# Patient Record
Sex: MALE | Race: WHITE
[De-identification: names, ages, dates, MRNs, and addresses within clinical notes are randomized per-mention and may not be internally consistent; named-entity substitution may affect disease eponyms.]

---

## 2018-09-11 ENCOUNTER — HOSPITAL ENCOUNTER (OUTPATIENT)
Dept: HOSPITAL 63 - DXRAD | Age: 78
Discharge: HOME | End: 2018-09-11
Attending: PHYSICIAN ASSISTANT
Payer: COMMERCIAL

## 2018-09-11 DIAGNOSIS — K21.9: ICD-10-CM

## 2018-09-11 DIAGNOSIS — M17.11: Primary | ICD-10-CM

## 2018-09-11 DIAGNOSIS — J44.9: ICD-10-CM

## 2018-09-11 PROCEDURE — 71046 X-RAY EXAM CHEST 2 VIEWS: CPT

## 2018-09-11 PROCEDURE — 73562 X-RAY EXAM OF KNEE 3: CPT

## 2018-09-11 NOTE — RAD
Right knee, 3 views, 9/11/2018:

 

HISTORY: Osteoarthritis

 

There is mild narrowing of the lateral compartment of the knee joint with 

mild marginal spurring. Mild spurring is present the patellofemoral 

articulation. No fracture or dislocation is identified. No significant 

joint effusion is seen. Moderate scattered arterial calcifications are 

present.

 

IMPRESSION:

1. Mild degenerative change.

2. No acute bony abnormality is detected.

 

 

 

Chest, 2 views, 9/11/2018:

 

HISTORY: Cough, COPD

 

The heart size and pulmonary vascularity are normal. There is mild 

tortuosity of the thoracic aorta. No pulmonary infiltrate is seen. There 

is no evidence of pleural fluid. Nodular shadows projected over the lung 

bases are probably nipple shadows. Mild spurring is present in the spine.

 

IMPRESSION: No acute cardiopulmonary abnormality is detected.

 

Electronically signed by: Rick Moritz, MD (9/11/2018 3:56 PM) [facility]

## 2018-09-11 NOTE — RAD
Right knee, 3 views, 9/11/2018:

 

HISTORY: Osteoarthritis

 

There is mild narrowing of the lateral compartment of the knee joint with 

mild marginal spurring. Mild spurring is present the patellofemoral 

articulation. No fracture or dislocation is identified. No significant 

joint effusion is seen. Moderate scattered arterial calcifications are 

present.

 

IMPRESSION:

1. Mild degenerative change.

2. No acute bony abnormality is detected.

 

 

 

Chest, 2 views, 9/11/2018:

 

HISTORY: Cough, COPD

 

The heart size and pulmonary vascularity are normal. There is mild 

tortuosity of the thoracic aorta. No pulmonary infiltrate is seen. There 

is no evidence of pleural fluid. Nodular shadows projected over the lung 

bases are probably nipple shadows. Mild spurring is present in the spine.

 

IMPRESSION: No acute cardiopulmonary abnormality is detected.

 

Electronically signed by: Rick Moritz, MD (9/11/2018 3:56 PM) [facility]

## 2019-06-17 ENCOUNTER — HOSPITAL ENCOUNTER (OUTPATIENT)
Dept: HOSPITAL 63 - DXRAD | Age: 79
Discharge: HOME | End: 2019-06-17
Attending: FAMILY MEDICINE
Payer: COMMERCIAL

## 2019-06-17 DIAGNOSIS — R63.4: ICD-10-CM

## 2019-06-17 DIAGNOSIS — I77.89: Primary | ICD-10-CM

## 2019-06-17 PROCEDURE — 71046 X-RAY EXAM CHEST 2 VIEWS: CPT

## 2019-06-17 NOTE — RAD
Chest, 2 views, 6/17/2019:

 

HISTORY: Weight loss

 

Comparison is made to a study from 9/11/2018. The heart size is within 

normal limits. There is mild tortuosity of the thoracic aorta. There is 

minimal linear scarring or atelectasis in the posterior costophrenic angle

on the right. No pulmonary consolidation is seen. There is no evidence of 

pleural fluid. Mild scattered spurs are present in the spine.

 

IMPRESSION: Minimal right basilar linear scarring or atelectasis.

 

Electronically signed by: Rick Moritz, MD (6/17/2019 2:23 PM) [facility]

## 2019-06-20 ENCOUNTER — HOSPITAL ENCOUNTER (OUTPATIENT)
Dept: HOSPITAL 63 - US | Age: 79
Discharge: HOME | End: 2019-06-20
Payer: COMMERCIAL

## 2019-06-20 DIAGNOSIS — R63.4: ICD-10-CM

## 2019-06-20 DIAGNOSIS — R10.9: ICD-10-CM

## 2019-06-20 DIAGNOSIS — R11.0: Primary | ICD-10-CM

## 2019-06-20 LAB
ALBUMIN SERPL-MCNC: 3.9 G/DL (ref 3.4–5)
ALBUMIN/GLOB SERPL: 1.1 {RATIO} (ref 1–1.7)
ALP SERPL-CCNC: 66 U/L (ref 46–116)
ALT SERPL-CCNC: 83 U/L (ref 16–63)
AMYLASE SERPL-CCNC: 26 U/L (ref 25–115)
ANION GAP SERPL CALC-SCNC: 9 MMOL/L (ref 6–14)
APTT PPP: (no result) S
AST SERPL-CCNC: 63 U/L (ref 15–37)
BACTERIA #/AREA URNS HPF: 0 /HPF
BILIRUB SERPL-MCNC: 1.3 MG/DL (ref 0.2–1)
BILIRUB UR QL STRIP: (no result)
BUN/CREAT SERPL: 20 (ref 6–20)
CA-I SERPL ISE-MCNC: 16 MG/DL (ref 8–26)
CALCIUM SERPL-MCNC: 9 MG/DL (ref 8.5–10.1)
CHLORIDE SERPL-SCNC: 105 MMOL/L (ref 98–107)
CO2 SERPL-SCNC: 27 MMOL/L (ref 21–32)
CREAT SERPL-MCNC: 0.8 MG/DL (ref 0.7–1.3)
FIBRINOGEN PPP-MCNC: (no result) MG/DL
GFR SERPLBLD BASED ON 1.73 SQ M-ARVRAT: 93.7 ML/MIN
GLOBULIN SER-MCNC: 3.7 G/DL (ref 2.2–3.8)
GLUCOSE SERPL-MCNC: 117 MG/DL (ref 70–99)
GLUCOSE UR STRIP-MCNC: (no result) MG/DL
LIPASE: 120 U/L (ref 73–393)
NITRITE UR QL STRIP: (no result)
POTASSIUM SERPL-SCNC: 3.7 MMOL/L (ref 3.5–5.1)
PROT SERPL-MCNC: 7.6 G/DL (ref 6.4–8.2)
RBC #/AREA URNS HPF: 0 /HPF (ref 0–2)
SODIUM SERPL-SCNC: 141 MMOL/L (ref 136–145)
SP GR UR STRIP: 1.02
SQUAMOUS #/AREA URNS LPF: (no result) /LPF
UROBILINOGEN UR-MCNC: 0.2 MG/DL
WBC #/AREA URNS HPF: 0 /HPF (ref 0–4)

## 2019-06-20 PROCEDURE — 83690 ASSAY OF LIPASE: CPT

## 2019-06-20 PROCEDURE — 80053 COMPREHEN METABOLIC PANEL: CPT

## 2019-06-20 PROCEDURE — 81001 URINALYSIS AUTO W/SCOPE: CPT

## 2019-06-20 PROCEDURE — 82140 ASSAY OF AMMONIA: CPT

## 2019-06-20 PROCEDURE — 87086 URINE CULTURE/COLONY COUNT: CPT

## 2019-06-20 PROCEDURE — 82150 ASSAY OF AMYLASE: CPT

## 2019-06-20 PROCEDURE — 36415 COLL VENOUS BLD VENIPUNCTURE: CPT

## 2019-06-20 PROCEDURE — 76700 US EXAM ABDOM COMPLETE: CPT

## 2019-06-20 NOTE — RAD
Complete abdominal ultrasound 6/20/2019 10:00 AM

 

Clinical History: Nausea, abdominal pain, weight loss

 

Technique:  Ultrasound examination of the abdomen was performed, and 

multiple static images were submitted for review.

 

Comparison:  CT of the abdomen and pelvis June 11, 2013  

 

Findings: Pancreas is poorly visualized. Portions of the aorta and IVC are

grossly unremarkable. The gallbladder is partially visualized. Visualized 

portions of the gallbladder demonstrates no wall thickening, or stones. 

The common bile duct is within normal limits measuring 4 mm diameter. The 

liver appears to be normal in size measuring approximately 14.5 cm 

longitudinally. No focal hepatic lesions are seen. No biliary dilatation 

is identified. The right kidney is unremarkable in appearance measuring 

12.6 cm in length. Left kidney is normal in appearance. The spleen is top 

normal measuring 11.8 cm longitudinal.

 

IMPRESSION:

1. No acute intra-abdominal abnormality is identified

2. Top normal size of the spleen

 

Electronically signed by: Melchor Terry MD (6/20/2019 12:55 PM) [id number]

## 2019-06-27 ENCOUNTER — HOSPITAL ENCOUNTER (OUTPATIENT)
Dept: HOSPITAL 63 - CT | Age: 79
Discharge: HOME | End: 2019-06-27
Payer: COMMERCIAL

## 2019-06-27 DIAGNOSIS — M41.85: ICD-10-CM

## 2019-06-27 DIAGNOSIS — I70.0: ICD-10-CM

## 2019-06-27 DIAGNOSIS — N40.0: ICD-10-CM

## 2019-06-27 DIAGNOSIS — K57.30: Primary | ICD-10-CM

## 2019-06-27 DIAGNOSIS — I25.10: ICD-10-CM

## 2019-06-27 PROCEDURE — 87340 HEPATITIS B SURFACE AG IA: CPT

## 2019-06-27 PROCEDURE — 74177 CT ABD & PELVIS W/CONTRAST: CPT

## 2019-06-27 PROCEDURE — 36415 COLL VENOUS BLD VENIPUNCTURE: CPT

## 2019-06-27 PROCEDURE — 86705 HEP B CORE ANTIBODY IGM: CPT

## 2019-06-27 PROCEDURE — 83520 IMMUNOASSAY QUANT NOS NONAB: CPT

## 2019-06-27 PROCEDURE — 86709 HEPATITIS A IGM ANTIBODY: CPT

## 2019-06-27 PROCEDURE — 86803 HEPATITIS C AB TEST: CPT

## 2019-06-27 PROCEDURE — 86038 ANTINUCLEAR ANTIBODIES: CPT

## 2019-06-27 NOTE — RAD
CT of the abdomen and pelvis with contrast, 6/27/2019:

 

HISTORY: Abdominal pain, weight loss, nausea and vomiting

 

Multidetector CT imaging was performed following oral and IV 

administration of contrast.

 

There is mild bibasilar linear scarring and/or atelectasis. Coronary 

artery calcifications are noted. There appear to be calcifications in the 

region of the aortic valve as well as the mitral annulus.

 

No hepatic abnormality is seen. The gallbladder is unremarkable. No 

pancreatic abnormality is seen. The spleen is at the upper limits of 

normal in size measuring 12.7 cm in length. It contains several 

calcifications. No renal abnormality is detected.

 

There is moderate calcific plaquing of the abdominal aorta and its 

branches without evidence of aneurysm. No abdominal or pelvic adenopathy 

is seen. The prostate gland is enlarged measuring 6 cm in length. It 

contains calcifications.

 

Colonic diverticula are present, most extensive in the sigmoid region. No 

paracolonic inflammatory process is seen. The bowel loops are not dilated.

The appendix shows no abnormality. No free fluid or free air is evident in

the abdomen or pelvis.

 

There is a mild thoracolumbar scoliosis with moderate multilevel 

degenerative change.

 

IMPRESSION:

1. Extensive sigmoid diverticulosis.

2. Nonspecific prostatic enlargement.

3. Calcific plaquing of the coronary arteries and the aortic valve.

 

 

 

PQRS Compliance Statement:

 

One or more of the following individualized dose reduction techniques were

utilized for this examination:

1. Automated exposure control

2. Adjustment of the mA and/or kV according to patient size

3. Use of iterative reconstruction technique

 

Electronically signed by: Rick Moritz, MD (6/27/2019 4:12 PM) [facility]

## 2019-06-30 LAB — MITOCHONDRIA AB SER QL: <20 UNITS (ref 0–20)

## 2019-07-05 ENCOUNTER — HOSPITAL ENCOUNTER (OUTPATIENT)
Dept: HOSPITAL 61 - SURG | Age: 79
End: 2019-07-05
Attending: INTERNAL MEDICINE
Payer: COMMERCIAL

## 2019-07-05 VITALS
SYSTOLIC BLOOD PRESSURE: 131 MMHG | DIASTOLIC BLOOD PRESSURE: 68 MMHG | DIASTOLIC BLOOD PRESSURE: 68 MMHG | SYSTOLIC BLOOD PRESSURE: 131 MMHG

## 2019-07-05 DIAGNOSIS — Z98.890: ICD-10-CM

## 2019-07-05 DIAGNOSIS — K57.30: ICD-10-CM

## 2019-07-05 DIAGNOSIS — F15.90: ICD-10-CM

## 2019-07-05 DIAGNOSIS — Z72.89: ICD-10-CM

## 2019-07-05 DIAGNOSIS — K29.50: Primary | ICD-10-CM

## 2019-07-05 DIAGNOSIS — Z98.52: ICD-10-CM

## 2019-07-05 DIAGNOSIS — Z87.39: ICD-10-CM

## 2019-07-05 PROCEDURE — 43235 EGD DIAGNOSTIC BRUSH WASH: CPT

## 2021-02-03 ENCOUNTER — HOSPITAL ENCOUNTER (OUTPATIENT)
Dept: HOSPITAL 61 - KCIC MRI | Age: 81
End: 2021-02-03
Payer: COMMERCIAL

## 2021-02-03 DIAGNOSIS — I67.82: Primary | ICD-10-CM

## 2021-02-03 DIAGNOSIS — G30.1: ICD-10-CM

## 2021-02-03 DIAGNOSIS — R41.3: ICD-10-CM

## 2021-02-03 DIAGNOSIS — F02.80: ICD-10-CM

## 2021-02-03 PROCEDURE — 70551 MRI BRAIN STEM W/O DYE: CPT

## 2021-02-03 NOTE — [facility]
MRI BRAIN WO 



Date: 2/3/2021 12:40 PM 



Indication:  EARLY ONSET OF ALZHEIMERS. Changes in memory, confusion. 



Comparison:  None. 



Technique: Multiplanar multisequence MRI of the brain was performed without intravenous contrast usin
g the standard protocol.



Findings: 



No acute infarct. No acute or chronic hemorrhage. The ventricles are normal in size and configuration
 without hydrocephalus. Mild scattered FLAIR hyperintensities in the subcortical and periventricular 
deep white matter, a nonspecific finding, most commonly seen with chronic small vessel ischemic disea
se. Moderate generalized cerebral volume loss.



The scalp and calvarium are normal. The pituitary and sella are normal. No Chiari malformation. The v
isualized upper cervical spine is normal.



The visualized orbits and globes are normal. The visualized paranasal sinuses are clear. The mastoid 
air cells are clear.



Normal flow voids within the vertebral, basilar, and internal carotid arteries indicating patency.



IMPRESSION:



1. No acute infarct, acute hemorrhage, mass, or hydrocephalus.

2. Mild chronic small vessel ischemic disease and moderate generalized cerebral volume loss



Electronically signed by: Rickey Mejia MD (2/3/2021 2:30 PM) [facility]

## 2021-08-26 ENCOUNTER — HOSPITAL ENCOUNTER (EMERGENCY)
Dept: HOSPITAL 63 - ER | Age: 81
Discharge: HOME | End: 2021-08-26
Payer: MEDICARE

## 2021-08-26 VITALS — BODY MASS INDEX: 25.24 KG/M2 | HEIGHT: 69 IN | WEIGHT: 170.42 LBS

## 2021-08-26 VITALS — DIASTOLIC BLOOD PRESSURE: 76 MMHG | SYSTOLIC BLOOD PRESSURE: 145 MMHG
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
## 2021-08-26 DIAGNOSIS — S42.302A: Primary | ICD-10-CM
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
## 2021-08-26 PROCEDURE — 73080 X-RAY EXAM OF ELBOW: CPT

## 2021-08-26 PROCEDURE — 73030 X-RAY EXAM OF SHOULDER: CPT

## 2021-08-26 PROCEDURE — 29105 APPLICATION LONG ARM SPLINT: CPT

## 2021-08-26 PROCEDURE — 71045 X-RAY EXAM CHEST 1 VIEW: CPT

## 2021-08-26 NOTE — RAD
XR SHOULDER_LEFT 2+ VIEWS 



DATE: 8/26/2021 5:14 PM



INDICATION:  fall, trauma   



COMPARISON:  None.



FINDINGS:  



Bones: Acute mildly displaced fracture of the proximal fibular diaphysis.



Joints: Glenohumeral joint is congruent. Severe glenohumeral degenerative changes. Mild acromioclavic
ular degenerative changes.   



IMPRESSION:  



Acute mildly displaced proximal humeral diaphysis fracture



Electronically signed by: Robin Vargas MD (8/26/2021 5:45 PM) [facility]

## 2021-08-26 NOTE — PHYS DOC
Past History


Past Surgical History:  No Surgical History


 (KHLOE PATEL)


Alcohol Use:  None


 (KHLOE PATEL)





Adult General


Chief Complaint


Chief Complaint:  MECHANICAL FALL





HPI


HPI





Patient is a 80-year-old male patient who presents with left shoulder pain after

fall.  Patient reports he had been in the yard today, working with his spouse, 

when he had slipped on the wet grass, and had fallen down landing on his left 

shoulder.  States he was not able to get up without help of EMS, states he has 

no discomfort in his legs, hip, back.  Only discomfort is to his left shoulder 

and left elbow.  States he is able to move his right shoulder normally has had a

previous rotator cuff surgery in his right shoulder.  But has had no surgery to 

his left shoulder.  Episode occurred approximately 1 hour ago.  Denies any 

paresthesia.  Does report he cannot raise his left arm and his pain when he 

tries to straighten his left elbow


 (KHLOE PATEL)





Review of Systems


Review of Systems





Constitutional: Denies fever or chills []





GI: Denies abdominal pain, nausea, vomiting, bloody stools or diarrhea []


: Denies dysuria or hematuria []


Musculoskeletal: Denies back pain or joint pain [] complains of pain to left 

shoulder, left elbow


Integument: Denies rash or skin lesions []


Neurologic: Denies headache, focal weakness or sensory changes []


Endocrine: Denies polyuria or polydipsia []





All other systems were reviewed and found to be within normal limits, except as 

documented in this note.


 (KHLOE PATEL)





Allergies


Allergies





Allergies








Coded Allergies Type Severity Reaction Last Updated Verified


 


  No Known Drug Allergies    6/27/19 No








 (KHLOE PATEL)





Physical Exam


Physical Exam





Constitutional: Well developed, well nourished, no acute distress, non-toxic 

appearance. []





Eyes: PERRLA, EOMI, conjunctiva normal, no discharge. [] 


Neck: Normal range of motion, no tenderness, supple, no stridor. [] 


Cardiovascular:Heart rate regular rhythm, no murmur []


Lungs & Thorax:  Bilateral breath sounds clear to auscultation []


Abdomen: Bowel sounds normal, soft, no tenderness, no masses, no pulsatile 

masses. [] 


Skin: Warm, dry, no erythema, no rash. [] 


Back: No tenderness, no CVA tenderness. [] 


Extremities: No tenderness, no cyanosis, no clubbing, ROM intact, no edema. [] 

Decreased passive and active range of motion to left shoulder, left elbow.  

Radial pulse strong to left arm.  Sensation to all digits.  Able to move all 

digits.  Left shoulder versus right shoulder appears asymmetric.  No tenderness 

on palpation of scapula or clavicle.  No tenting noted to skin or deformity 

visualized externally.


Neurologic: Alert and oriented X 3, normal motor function, normal sensory 

function, no focal deficits noted. []


Psychologic: Affect normal, judgement normal, mood normal. []


 (KHLOE PATEL)





Current Patient Data


Vital Signs





                                   Vital Signs








  Date Time  Temp Pulse Resp B/P (MAP) Pulse Ox O2 Delivery O2 Flow Rate FiO2


 


8/26/21 16:59  83 18 145/76 93   








 (KHLOE PATEL)





EKG


EKG


[]


 (KHLOE PATEL)





Radiology/Procedures


Radiology/Procedures


[]R CHEST 1V





Clinical History: Reason: fall, left shoulder fx / Spl. Instructions:  / 

History: 





Technique:  AP view of the chest was obtained at 8/26/2021 5:27 PM.





Comparison: June 17, 2019.





Findings:


The cardiomediastinal silhouette is normal. The pulmonary vasculature is normal.

 Basilar linear opacities are seen previously.





Impression:  





Chronic changes in the lung bases. No acute findings.





Electronically signed by: Narendra Martinez III, MD (8/26/2021 5:49 PM) [facility]














DICTATED AND SIGNED BY:     NARENDRA MARTINEZ III, MD


DATE:     08/26/21 1747





CC: ALIVIA ALEJANDRA MD; KHLOE PATEL ~MTH0 0








XR SHOULDER_LEFT 2+ VIEWS 





DATE: 8/26/2021 5:14 PM





INDICATION:  fall, trauma   





COMPARISON:  None.





FINDINGS:  





Bones: Acute mildly displaced fracture of the proximal fibular diaphysis.





Joints: Glenohumeral joint is congruent. Severe glenohumeral degenerative 

changes. Mild acromioclavicular degenerative changes.   





IMPRESSION:  





Acute mildly displaced proximal humeral diaphysis fracture





Electronically signed by: Giancarlo Vargas MD (8/26/2021 5:45 PM) [facility] [[email or web address]]














DICTATED AND SIGNED BY:     GIANCARLO VARGAS MD


DATE:     08/26/21 1743





CC: ALIVIA ALEJANDRA MD; KHLOE PATEL ~MTH0 0











PROCEDURE: ELBOW LEFT 3V





XR ELBOW COMPLETE_LEFT 3+VIEWS





DATE: 8/26/2021 5:14 PM





INDICATION:  Reason: fall, trauma / Spl. Instructions:  / History:    





COMPARISON:  None.





FINDINGS:   





Bones: There is no evidence of acute fracture or dislocation. 





Joints:  Moderate degenerative changes of the elbow joint. There is no joint 

effusion.





Miscellaneous: None.





IMPRESSION:  





No acute fracture.





Electronically signed by: Giancarlo Vargas MD (8/26/2021 5:48 PM) [facility] [[email or web address]]














DICTATED AND SIGNED BY:     GIANCARLO VARGAS MD


DATE:     08/26/21 1745





CC: ALIVIA ALEJANDRA MD; KHLOE PATEL ~MTH0 0





 (KHLOE PATEL)





Heart Score


C/O Chest Pain:  N/A


Risk Factors:


Risk Factors:  DM, Current or recent (<one month) smoker, HTN, HLP, family 

history of CAD, obesity.


Risk Scores:


Risk Factors:  DM, Current or recent (<one month) smoker, HTN, HLP, family 

history of CAD, obesity.


 (KHLOE PATEL)





Course & Med Decision Making


Course & Med Decision Making


Pertinent Labs and Imaging studies reviewed. (See chart for details)





[] Given history of fall with trauma to left shoulder, will do imaging at this 

time.  Considerations include shoulder dislocation, numerous fracture, elbow 

dislocation, elbow fracture, contusion.  No discomfort on palpation over body of

 humerus, however left versus right shoulder is asymmetric.





Noting left humeral fracture, will place shoulder immobilizer, have patient 

follow-up with the evidence orthopedics.  Patient with sensation intact to all 

digits, able to move all digits.  Brisk cap refill to all digits.  Discussed 

with patient spouse and patient, agreeable with plan to follow-up with 

orthopedics.


 (KHLOE PATEL)


Course & Med Decision Making


I was the Attending physician on the above date of service of this patient. This

 patient was evaluated, examined, treated, and dispositioned from the emergency 

department by the mid-level practitioner. I disclosed need for close outpatient 

orthopedic follow-up. No emergent surgical indication at present





Electronically signed, Kerri Velasco DO





 (KERRI VELASCO DO)


Milly Disclaimer


Dragon Disclaimer


This electronic medical record was generated, in whole or in part, using a voice

 recognition dictation system.


 (KHLOE PATEL)





Departure


Departure:


Impression:  


   Primary Impression:  


   Left humeral fracture


Disposition:  01 HOME / SELF CARE / HOMELESS


Condition:  GOOD


Referrals:  


VLADIMIR MEDELLIN (PCP)








[facility]


Patient Instructions:  Shoulder Fracture (Proximal Humerus or Glenoid)-[facility]





Additional Instructions:  


As discussed, take tylenol / ibuprofen / pain medications as needed for 

discomfort





Keep his arm in the splint until released by Orthopedics


Follow up with the Orthopedic clinic in the next week





Problem Qualifiers








   Primary Impression:  


   Left humeral fracture


   Encounter type:  initial encounter  Humerus Location:  proximal  Fracture 

   type:  closed  Fracture morphology:  other fracture  Fracture alignment:  

   displaced  Qualified Codes:  S42.292A - Other displaced fracture of upper end

    of left humerus, initial encounter for closed fracture








KHLOE PATEL              Aug 26, 2021 17:16


KERRI VELASCO DO                 Aug 28, 2021 06:10

## 2021-08-26 NOTE — RAD
XR ELBOW COMPLETE_LEFT 3+VIEWS



DATE: 8/26/2021 5:14 PM



INDICATION:  Reason: fall, trauma / Spl. Instructions:  / History:    



COMPARISON:  None.



FINDINGS:   



Bones: There is no evidence of acute fracture or dislocation. 



Joints:  Moderate degenerative changes of the elbow joint. There is no joint effusion.



Miscellaneous: None.



IMPRESSION:  



No acute fracture.



Electronically signed by: Robin Vargas MD (8/26/2021 5:48 PM) [facility]

## 2021-08-26 NOTE — RAD
XR CHEST 1V



Clinical History: Reason: fall, left shoulder fx / Spl. Instructions:  / History: 



Technique:  AP view of the chest was obtained at 8/26/2021 5:27 PM.



Comparison: June 17, 2019.



Findings:

The cardiomediastinal silhouette is normal. The pulmonary vasculature is normal. Basilar linear opaci
ties are seen previously.



Impression:  



Chronic changes in the lung bases. No acute findings.



Electronically signed by: Tony Garcia III, MD (8/26/2021 5:49 PM) [facility]